# Patient Record
Sex: FEMALE | Race: WHITE | Employment: OTHER | URBAN - METROPOLITAN AREA
[De-identification: names, ages, dates, MRNs, and addresses within clinical notes are randomized per-mention and may not be internally consistent; named-entity substitution may affect disease eponyms.]

---

## 2021-05-24 ENCOUNTER — APPOINTMENT (OUTPATIENT)
Dept: GENERAL RADIOLOGY | Age: 71
DRG: 433 | End: 2021-05-24
Attending: EMERGENCY MEDICINE
Payer: MEDICARE

## 2021-05-24 ENCOUNTER — HOSPITAL ENCOUNTER (INPATIENT)
Facility: HOSPITAL | Age: 71
LOS: 2 days | Discharge: HOME | DRG: 433 | End: 2021-05-26
Attending: EMERGENCY MEDICINE | Admitting: HOSPITALIST
Payer: MEDICARE

## 2021-05-24 DIAGNOSIS — R18.8 OTHER ASCITES: Primary | ICD-10-CM

## 2021-05-24 PROBLEM — E87.6 HYPOKALEMIA: Status: ACTIVE | Noted: 2021-05-24

## 2021-05-24 PROBLEM — R73.9 HYPERGLYCEMIA: Status: ACTIVE | Noted: 2021-05-24

## 2021-05-24 PROBLEM — E87.1 HYPONATREMIA: Status: ACTIVE | Noted: 2021-05-24

## 2021-05-24 PROCEDURE — 71045 X-RAY EXAM CHEST 1 VIEW: CPT | Performed by: EMERGENCY MEDICINE

## 2021-05-24 PROCEDURE — 99223 1ST HOSP IP/OBS HIGH 75: CPT | Performed by: INTERNAL MEDICINE

## 2021-05-24 RX ORDER — MELATONIN
3 NIGHTLY PRN
Status: DISCONTINUED | OUTPATIENT
Start: 2021-05-24 | End: 2021-05-27

## 2021-05-24 RX ORDER — METOCLOPRAMIDE HYDROCHLORIDE 5 MG/ML
10 INJECTION INTRAMUSCULAR; INTRAVENOUS EVERY 8 HOURS PRN
Status: DISCONTINUED | OUTPATIENT
Start: 2021-05-24 | End: 2021-05-27

## 2021-05-24 RX ORDER — ONDANSETRON 2 MG/ML
4 INJECTION INTRAMUSCULAR; INTRAVENOUS EVERY 6 HOURS PRN
Status: DISCONTINUED | OUTPATIENT
Start: 2021-05-24 | End: 2021-05-27

## 2021-05-24 RX ORDER — ONDANSETRON 2 MG/ML
4 INJECTION INTRAMUSCULAR; INTRAVENOUS EVERY 4 HOURS PRN
Status: ACTIVE | OUTPATIENT
Start: 2021-05-24 | End: 2021-05-25

## 2021-05-24 RX ORDER — ACETAMINOPHEN 325 MG/1
650 TABLET ORAL EVERY 6 HOURS PRN
Status: DISCONTINUED | OUTPATIENT
Start: 2021-05-24 | End: 2021-05-25

## 2021-05-24 NOTE — H&P
[facility]  History and Physical     Juan Carlos Bence Patient Status:  Emergency    1950 MRN [id number]   Location [address] [place] [address] Attending Lauren Collins MD   Hosp Day # 0 PCP PHYSICIAN NONSTAFF     Chief Complaint facility-administered medications on file prior to encounter. No current outpatient medications on file prior to encounter. Review of Systems:   A comprehensive 14 point review of systems was completed.     Pertinent positives and negatives noted in t LDH, DDIMER in the last 168 hours. Imaging: Imaging data reviewed in Epic. ASSESSMENT / PLAN:     1. Ascites - unclear etiology, no other clinical findings to suggest hepatic failure/cirrhosis, concerning for Malignant Ascites  1.  NPO  2. Diagnosti

## 2021-05-24 NOTE — ED PROVIDER NOTES
Patient Seen in: THE [facility] Emergency Department In [place]      History   Patient presents with:  Swelling Edema  Fatigue    Stated Complaint: abdomen bloating for 2 -3 weeks as well as fatigue     HPI/Subjective:   HPI    Patient is a pleasant 70-year-o HEENT: Head is without evidence of trauma. Extraocular muscles are intact. Pupils are equal and reactive to light. NECK: Neck is supple. The trachea is midline. LUNGS: Lungs are clear to auscultation bilaterally, respirations are unlabored.    HEART: PLATELET.   Procedure                               Abnormality         Status                     ---------                               -----------         ------                     CBC W/ DIFFERENTIAL[[id number]]                              Final resul treatment was recommended. After considering the risks, concerns, and options, the patient and family agree. Patient will be admitted to the service of the [facility]. Dr. Ciera Wilson notified. Gastroenterology to follow in consultation.   Dr. Kenisha Watt

## 2021-05-25 ENCOUNTER — APPOINTMENT (OUTPATIENT)
Dept: ULTRASOUND IMAGING | Facility: HOSPITAL | Age: 71
DRG: 433 | End: 2021-05-25
Attending: INTERNAL MEDICINE
Payer: MEDICARE

## 2021-05-25 ENCOUNTER — APPOINTMENT (OUTPATIENT)
Dept: CT IMAGING | Facility: HOSPITAL | Age: 71
DRG: 433 | End: 2021-05-25
Attending: STUDENT IN AN ORGANIZED HEALTH CARE EDUCATION/TRAINING PROGRAM
Payer: MEDICARE

## 2021-05-25 PROCEDURE — 0W9G3ZZ DRAINAGE OF PERITONEAL CAVITY, PERCUTANEOUS APPROACH: ICD-10-PCS | Performed by: RADIOLOGY

## 2021-05-25 PROCEDURE — 49083 ABD PARACENTESIS W/IMAGING: CPT | Performed by: INTERNAL MEDICINE

## 2021-05-25 PROCEDURE — 99232 SBSQ HOSP IP/OBS MODERATE 35: CPT | Performed by: INTERNAL MEDICINE

## 2021-05-25 PROCEDURE — 74170 CT ABD WO CNTRST FLWD CNTRST: CPT | Performed by: STUDENT IN AN ORGANIZED HEALTH CARE EDUCATION/TRAINING PROGRAM

## 2021-05-25 RX ORDER — ACETAMINOPHEN 325 MG/1
650 TABLET ORAL EVERY 6 HOURS PRN
Status: DISCONTINUED | OUTPATIENT
Start: 2021-05-25 | End: 2021-05-27

## 2021-05-25 RX ORDER — HYDROCODONE BITARTRATE AND ACETAMINOPHEN 5; 325 MG/1; MG/1
1 TABLET ORAL EVERY 4 HOURS PRN
Status: DISCONTINUED | OUTPATIENT
Start: 2021-05-25 | End: 2021-05-27

## 2021-05-25 RX ORDER — MORPHINE SULFATE 2 MG/ML
2 INJECTION, SOLUTION INTRAMUSCULAR; INTRAVENOUS EVERY 2 HOUR PRN
Status: DISCONTINUED | OUTPATIENT
Start: 2021-05-25 | End: 2021-05-27

## 2021-05-25 NOTE — PROCEDURES
[facility] [place]  Procedure Note    Siddhartha Feast Patient Status:  Inpatient    1950 MRN [id number]   [facility] 4NW-A Attending Madonna Bhandari, DO   Hosp Day # 1 PCP PHYSICIAN NONSTAFF     LOCATION: Right lower quadrant  FLUID REMOVED: 6

## 2021-05-25 NOTE — PROGRESS NOTES
[facility]  Progress Note     Shakir Solorio Patient Status:  Inpatient    1950 MRN [id number]   [facility] 4NW-A Attending Mallory Taylor,    Hosp Day # 1 PCP PHYSICIAN NONSTAFF     Chief Complaint: Abdominal distention    S: No (based on SCr of 0.64 mg/dL). Recent Labs   Lab 05/24/21  1607   PTP 13.6   INR 1.05     No results for input(s): TROP, CK in the last 168 hours. Imaging: Imaging data reviewed in Epic.     Medications:   • cefTRIAXone  1 g Intravenous Q24H     ASSES

## 2021-05-25 NOTE — ED QUICK NOTES
Report given to floor. Pt to travel by private car. piv saline locked and wrapped . Pt given travel by car instructions, verbalized understanding.

## 2021-05-25 NOTE — PROGRESS NOTES
Pt is alert and oriented and has no complaints of pain. Lumgs diminished and pt started on iv antibiotic for uti. Pt has no medical history and has heplock. Abdomen large and distended. Abdomen firm. Pt will have a paracentesis today.  Pt remains npo

## 2021-05-25 NOTE — DIETARY NOTE
[facility]    NUTRITION ASSESSMENT    Pt does not meet malnutrition criteria. NUTRITION INTERVENTION:    1. Meal and Snacks - monitor patient po intake. Encourage adequate po of appropriate diet. 2. ONS add Ensure HP TID.    3. Vitamin and Mineral discretion    NUTRITION DIAGNOSIS/PROBLEM:  Predicted suboptimal energy intake related to physiological causes and insufficient appetite resulting in inadequate nutrition intake as evidenced by documented/reported insufficient oral intake and documented/re

## 2021-05-25 NOTE — CONSULTS
[facility]  Report of GI Consultation    Davidmaricarmen Molinaer Patient Status:  Inpatient    1950 MRN [id number]   [facility] 4NW-A Attending Wyatt Mcmahon DO   Hosp Day # 1 PCP PHYSICIAN NONSTAFF     Date of Admission:  2021  Date of weight 120 lb 8 oz (54.7 kg), SpO2 99 %. GENERAL: NAD, oriented x 3, pleasant  HENT: Normocephalic, mild temporal wasting. Normal oral mucosa. EYES: No scleral icterus, no conjunctival pallor.   PULM: Lungs clear to auscultation anteriorly  CV: Regular participate in the care of your patient.     Payal Hendricks MD  5/25/2021

## 2021-05-25 NOTE — PROGRESS NOTES
Pt is alert and oriented x4, Pts abdomen is firm and very large. Pt denies pain. Pt NPO for CT abdomen. +smoker. RA Lungs clear. Rocephin IV. Pt is up with assist and steady. GI on consult. Safety measures are in place.  Will continue to monitor

## 2021-05-26 VITALS
TEMPERATURE: 98 F | HEART RATE: 84 BPM | BODY MASS INDEX: 20.08 KG/M2 | SYSTOLIC BLOOD PRESSURE: 127 MMHG | RESPIRATION RATE: 18 BRPM | HEIGHT: 65 IN | OXYGEN SATURATION: 96 % | WEIGHT: 120.5 LBS | DIASTOLIC BLOOD PRESSURE: 76 MMHG

## 2021-05-26 PROCEDURE — 99239 HOSP IP/OBS DSCHRG MGMT >30: CPT | Performed by: HOSPITALIST

## 2021-05-26 RX ORDER — SPIRONOLACTONE 25 MG/1
50 TABLET ORAL DAILY
Status: DISCONTINUED | OUTPATIENT
Start: 2021-05-26 | End: 2021-05-27

## 2021-05-26 RX ORDER — SPIRONOLACTONE 25 MG/1
25 TABLET ORAL DAILY
Qty: 30 TABLET | Refills: 2 | Status: SHIPPED | OUTPATIENT
Start: 2021-05-26 | End: 2021-07-19

## 2021-05-26 RX ORDER — FUROSEMIDE 20 MG/1
20 TABLET ORAL
Status: DISCONTINUED | OUTPATIENT
Start: 2021-05-26 | End: 2021-05-27

## 2021-05-26 RX ORDER — FUROSEMIDE 20 MG/1
20 TABLET ORAL DAILY
Qty: 30 TABLET | Refills: 2 | Status: SHIPPED | OUTPATIENT
Start: 2021-05-26 | End: 2021-07-19

## 2021-05-26 NOTE — PLAN OF CARE
Patient is alert and oriented. Room air. Vss. Afebrile. Reports \" feeling much better\" Denies pain. Reports\" abdomen softer after paracentesis. \" Slept well.    Problem: GASTROINTESTINAL - ADULT  Goal: Minimal or absence of nausea and vomiting  Descripti

## 2021-05-26 NOTE — PROGRESS NOTES
[facility]  Progress Note     Maria Haris Patient Status:  Inpatient    1950 MRN [id number]   [facility] 4NW-A Attending Rachael Sullivan, DO   Hosp Day # 2 PCP PHYSICIAN NONSTAFF     Chief Complaint: Abdominal distention    S:  P 15.0*   INR 1.05 1.14*     No results for input(s): TROP, CK in the last 168 hours. Imaging: Imaging data reviewed in Epic. Medications:   • cefTRIAXone  1 g Intravenous Q24H     ASSESSMENT / PLAN:     1. Cirrhosis with Ascites  1.  S/p paracentesis

## 2021-05-26 NOTE — PROGRESS NOTES
Brief Note, full consult to follow    Presents with new onset ascites. Remote minor ETOH use  Blood transfusion in the 80s, was informed she had HCV pos testing shortly after that in the late 80s, never followed-up.   Feeling good now, no confusion, no GI

## 2021-05-27 NOTE — PLAN OF CARE
NURSING DISCHARGE NOTE    Patient discharged to home. Discharge documents and instructions given to patient, verbalized understanding. Personal belongings went home with patient. Vital signs stable.

## 2021-06-01 ENCOUNTER — LAB ENCOUNTER (OUTPATIENT)
Dept: LAB | Age: 71
End: 2021-06-01
Attending: STUDENT IN AN ORGANIZED HEALTH CARE EDUCATION/TRAINING PROGRAM
Payer: MEDICARE

## 2021-06-01 DIAGNOSIS — R18.8 OTHER ASCITES: ICD-10-CM

## 2021-06-01 PROCEDURE — 80053 COMPREHEN METABOLIC PANEL: CPT

## 2021-06-01 PROCEDURE — 36415 COLL VENOUS BLD VENIPUNCTURE: CPT

## 2021-06-09 NOTE — DISCHARGE SUMMARY
[facility]  DISCHARGE SUMMARY     Glen Betancourt Patient Status:  Inpatient    1950 MRN [id number]   [facility] 4NW-A Attending No att. providers found   [facility] Day # 2 PCP PHYSICIAN NONSTAFF     Date of Admission: 2021  Date o A1c of 7.2. The patient was eventually able to be started on oral diuretics. She is known to have hepatitis C since the late [de-identified]. She was eventually discharged home with plans to follow-up with gastroenterology as an outpatient.     Lace+ Score: 35  59-9 sounds.     -----------------------------------------------------------------------------------------------  PATIENT DISCHARGE INSTRUCTIONS: See electronic chart    Bhavin Ramirez MD    Time spent:  > 30 minutes

## 2021-07-12 ENCOUNTER — LAB ENCOUNTER (OUTPATIENT)
Dept: LAB | Facility: HOSPITAL | Age: 71
End: 2021-07-12
Attending: STUDENT IN AN ORGANIZED HEALTH CARE EDUCATION/TRAINING PROGRAM
Payer: MEDICARE

## 2021-07-12 ENCOUNTER — HOSPITAL ENCOUNTER (OUTPATIENT)
Dept: ULTRASOUND IMAGING | Facility: HOSPITAL | Age: 71
Discharge: HOME | End: 2021-07-12
Attending: STUDENT IN AN ORGANIZED HEALTH CARE EDUCATION/TRAINING PROGRAM
Payer: MEDICARE

## 2021-07-12 VITALS
WEIGHT: 120 LBS | SYSTOLIC BLOOD PRESSURE: 107 MMHG | TEMPERATURE: 98 F | DIASTOLIC BLOOD PRESSURE: 60 MMHG | OXYGEN SATURATION: 95 % | BODY MASS INDEX: 19.99 KG/M2 | HEIGHT: 65 IN | RESPIRATION RATE: 16 BRPM | HEART RATE: 86 BPM

## 2021-07-12 DIAGNOSIS — K74.60 CIRRHOSIS OF LIVER WITH ASCITES, UNSPECIFIED HEPATIC CIRRHOSIS TYPE (HCC): ICD-10-CM

## 2021-07-12 DIAGNOSIS — R18.8 CIRRHOSIS OF LIVER WITH ASCITES, UNSPECIFIED HEPATIC CIRRHOSIS TYPE (HCC): ICD-10-CM

## 2021-07-12 LAB
ALBUMIN SERPL-MCNC: 2.6 G/DL (ref 3.4–5)
ALBUMIN/GLOB SERPL: 0.6 {RATIO} (ref 1–2)
ALP LIVER SERPL-CCNC: 120 U/L
ALT SERPL-CCNC: 65 U/L
ANION GAP SERPL CALC-SCNC: 7 MMOL/L (ref 0–18)
AST SERPL-CCNC: 90 U/L (ref 15–37)
BASOPHILS # BLD AUTO: 0.01 X10(3) UL (ref 0–0.2)
BASOPHILS NFR BLD AUTO: 0.1 %
BASOPHILS NFR PRT: 0 %
BILIRUB SERPL-MCNC: 1.1 MG/DL (ref 0.1–2)
BUN BLD-MCNC: 28 MG/DL (ref 7–18)
BUN/CREAT SERPL: 28.3 (ref 10–20)
CALCIUM BLD-MCNC: 8.3 MG/DL (ref 8.5–10.1)
CHLORIDE SERPL-SCNC: 98 MMOL/L (ref 98–112)
CO2 SERPL-SCNC: 27 MMOL/L (ref 21–32)
CREAT BLD-MCNC: 0.99 MG/DL
DEPRECATED RDW RBC AUTO: 49.5 FL (ref 35.1–46.3)
EOSINOPHIL # BLD AUTO: 0 X10(3) UL (ref 0–0.7)
EOSINOPHIL NFR BLD AUTO: 0 %
EOSINOPHIL NFR PRT: 0 %
ERYTHROCYTE [DISTWIDTH] IN BLOOD BY AUTOMATED COUNT: 14.4 % (ref 11–15)
GLOBULIN PLAS-MCNC: 4.7 G/DL (ref 2.8–4.4)
GLUCOSE BLD-MCNC: 199 MG/DL (ref 70–99)
HCT VFR BLD AUTO: 31.1 %
HGB BLD-MCNC: 10.3 G/DL
IMM GRANULOCYTES # BLD AUTO: 0.07 X10(3) UL (ref 0–1)
IMM GRANULOCYTES NFR BLD: 0.6 %
INR BLD: 1.1 (ref 0.89–1.11)
LYMPHOCYTES # BLD AUTO: 2.06 X10(3) UL (ref 1–4)
LYMPHOCYTES NFR BLD AUTO: 16.5 %
LYMPHOCYTES NFR PRT: 66 %
M PROTEIN MFR SERPL ELPH: 7.3 G/DL (ref 6.4–8.2)
MCH RBC QN AUTO: 31.8 PG (ref 26–34)
MCHC RBC AUTO-ENTMCNC: 33.1 G/DL (ref 31–37)
MCV RBC AUTO: 96 FL
MESOTHL CELL NFR PRT: 3 %
MONOCYTES # BLD AUTO: 1.18 X10(3) UL (ref 0.1–1)
MONOCYTES NFR BLD AUTO: 9.5 %
MONOS+MACROS NFR PRT: 29 %
NEUTROPHILS # BLD AUTO: 9.15 X10 (3) UL (ref 1.5–7.7)
NEUTROPHILS # BLD AUTO: 9.15 X10(3) UL (ref 1.5–7.7)
NEUTROPHILS NFR BLD AUTO: 73.3 %
NEUTROPHILS PERITONEAL FLUID: 2 %
OSMOLALITY SERPL CALC.SUM OF ELEC: 285 MOSM/KG (ref 275–295)
PATIENT FASTING Y/N/NP: YES
PLATELET # BLD AUTO: 144 10(3)UL (ref 150–450)
POTASSIUM SERPL-SCNC: 3.9 MMOL/L (ref 3.5–5.1)
PSA SERPL DL<=0.01 NG/ML-MCNC: 14.5 SECONDS (ref 12.4–14.6)
RBC # BLD AUTO: 3.24 X10(6)UL
RBC PERITONEAL FLUID: <3000 /MM3
SODIUM SERPL-SCNC: 132 MMOL/L (ref 136–145)
TOTAL CELLS COUNTED: 100
TURBIDITY CSF QL: CLEAR
WBC # BLD AUTO: 12.5 X10(3) UL (ref 4–11)
WBC PERITONEAL FLUID: 118 /MM3

## 2021-07-12 PROCEDURE — 36415 COLL VENOUS BLD VENIPUNCTURE: CPT

## 2021-07-12 PROCEDURE — 96360 HYDRATION IV INFUSION INIT: CPT

## 2021-07-12 PROCEDURE — 89051 BODY FLUID CELL COUNT: CPT

## 2021-07-12 PROCEDURE — 87205 SMEAR GRAM STAIN: CPT

## 2021-07-12 PROCEDURE — 87070 CULTURE OTHR SPECIMN AEROBIC: CPT

## 2021-07-12 PROCEDURE — 89050 BODY FLUID CELL COUNT: CPT

## 2021-07-12 PROCEDURE — 85025 COMPLETE CBC W/AUTO DIFF WBC: CPT

## 2021-07-12 PROCEDURE — 80053 COMPREHEN METABOLIC PANEL: CPT

## 2021-07-12 PROCEDURE — 85610 PROTHROMBIN TIME: CPT

## 2021-07-12 PROCEDURE — P9047 ALBUMIN (HUMAN), 25%, 50ML: HCPCS

## 2021-07-12 PROCEDURE — 49083 ABD PARACENTESIS W/IMAGING: CPT | Performed by: STUDENT IN AN ORGANIZED HEALTH CARE EDUCATION/TRAINING PROGRAM

## 2021-07-12 RX ORDER — ACETAMINOPHEN 500 MG
500 TABLET ORAL EVERY 6 HOURS PRN
Status: DISCONTINUED | OUTPATIENT
Start: 2021-07-12 | End: 2021-07-14

## 2021-07-12 RX ORDER — SODIUM CHLORIDE 9 MG/ML
INJECTION, SOLUTION INTRAVENOUS CONTINUOUS
Status: DISCONTINUED | OUTPATIENT
Start: 2021-07-12 | End: 2021-07-14

## 2021-07-12 RX ORDER — ALBUMIN (HUMAN) 12.5 G/50ML
SOLUTION INTRAVENOUS
Status: COMPLETED
Start: 2021-07-12 | End: 2021-07-12

## 2021-07-12 RX ORDER — ALBUMIN (HUMAN) 12.5 G/50ML
25 SOLUTION INTRAVENOUS ONCE
Status: COMPLETED | OUTPATIENT
Start: 2021-07-12 | End: 2021-07-12

## 2021-07-12 RX ORDER — ACETAMINOPHEN 500 MG
1000 TABLET ORAL EVERY 6 HOURS PRN
Status: DISCONTINUED | OUTPATIENT
Start: 2021-07-12 | End: 2021-07-14

## 2021-07-12 RX ORDER — ACETAMINOPHEN 500 MG
TABLET ORAL
Status: DISCONTINUED
Start: 2021-07-12 | End: 2021-07-13

## 2021-07-12 RX ADMIN — ACETAMINOPHEN 1000 MG: 500 MG TABLET ORAL at 14:41:00

## 2021-07-12 RX ADMIN — ALBUMIN (HUMAN) 25 G: 12.5 SOLUTION INTRAVENOUS at 14:23:00

## 2021-07-12 NOTE — PROCEDURES
[facility]  Procedure Note    Kita Golden Patient Status:  Outpatient    1950 MRN [id number]   Location [address] [place] [address] Attending Kelly Hendricks MD   Hosp Day # 0 PCP None Pcp     Procedure: paracentesis    Pre-Procedure Diagn

## 2021-07-21 VITALS — HEIGHT: 65 IN | WEIGHT: 120 LBS | BODY MASS INDEX: 19.99 KG/M2

## 2021-07-26 RX ORDER — SODIUM CHLORIDE 9 MG/ML
INJECTION, SOLUTION INTRAVENOUS CONTINUOUS
Status: CANCELLED | OUTPATIENT
Start: 2021-07-26

## 2021-07-27 ENCOUNTER — APPOINTMENT (OUTPATIENT)
Dept: LAB | Facility: HOSPITAL | Age: 71
End: 2021-07-27
Attending: INTERNAL MEDICINE
Payer: MEDICARE

## 2021-07-27 ENCOUNTER — HOSPITAL ENCOUNTER (OUTPATIENT)
Dept: ULTRASOUND IMAGING | Facility: HOSPITAL | Age: 71
Discharge: HOME | End: 2021-07-27
Attending: INTERNAL MEDICINE
Payer: MEDICARE

## 2021-07-27 DIAGNOSIS — R18.8 ASCITES: Primary | ICD-10-CM
